# Patient Record
Sex: MALE | Race: BLACK OR AFRICAN AMERICAN | Employment: FULL TIME | URBAN - METROPOLITAN AREA
[De-identification: names, ages, dates, MRNs, and addresses within clinical notes are randomized per-mention and may not be internally consistent; named-entity substitution may affect disease eponyms.]

---

## 2021-10-20 ENCOUNTER — OFFICE VISIT (OUTPATIENT)
Dept: PRIMARY CARE CLINIC | Age: 41
End: 2021-10-20
Payer: COMMERCIAL

## 2021-10-20 VITALS
DIASTOLIC BLOOD PRESSURE: 85 MMHG | TEMPERATURE: 98.1 F | HEART RATE: 74 BPM | OXYGEN SATURATION: 100 % | HEIGHT: 70 IN | SYSTOLIC BLOOD PRESSURE: 118 MMHG | WEIGHT: 171 LBS | RESPIRATION RATE: 16 BRPM | BODY MASS INDEX: 24.48 KG/M2

## 2021-10-20 DIAGNOSIS — S69.91XS INJURY OF FINGER OF RIGHT HAND, SEQUELA: Primary | ICD-10-CM

## 2021-10-20 PROCEDURE — 99212 OFFICE O/P EST SF 10 MIN: CPT | Performed by: INTERNAL MEDICINE

## 2021-10-20 RX ORDER — DICLOFENAC SODIUM 75 MG/1
75 TABLET, DELAYED RELEASE ORAL 2 TIMES DAILY
Qty: 15 TABLET | Refills: 0 | Status: SHIPPED | OUTPATIENT
Start: 2021-10-20

## 2021-10-20 RX ORDER — PREDNISONE 10 MG/1
10 TABLET ORAL SEE ADMIN INSTRUCTIONS
Qty: 21 TABLET | Refills: 0 | Status: SHIPPED | OUTPATIENT
Start: 2021-10-20

## 2021-10-20 NOTE — PROGRESS NOTES
HISTORY OF PRESENT ILLNESS  Lamine Garcia is a 46 y.o. male. Patient reports that Monday he was in a training session for the [employer] [facility] [employer]. He describes that after he took his glove off his right thumb was in pain. Is not sure if he was hit or clipped from the punching. Reports that it is painful to rotate in and out. Has been having some swelling. Is his dominate hand, so has been painful to use. Denies any numbness but has some tingling. Visit Vitals  /85   Pulse 74   Temp 98.1 °F (36.7 °C)   Resp 16   Ht 5' 10\" (1.778 m)   Wt 171 lb (77.6 kg)   SpO2 100%   BMI 24.54 kg/m²   History reviewed. No pertinent past medical history. History reviewed. No pertinent surgical history. Family History   Problem Relation Age of Onset    No Known Problems Mother      Outpatient Encounter Medications as of 10/20/2021   Medication Sig Dispense Refill    predniSONE (STERAPRED DS) 10 mg dose pack Take 1 Tablet by mouth See Admin Instructions. See administration instruction per 10mg dose pack 21 Tablet 0    diclofenac EC (VOLTAREN) 75 mg EC tablet Take 1 Tablet by mouth two (2) times a day. 15 Tablet 0    diazepam (VALIUM) 5 mg tablet Take 1 tablet by mouth every eight (8) hours as needed (spasm). 15 tablet 0     No facility-administered encounter medications on file as of 10/20/2021. HPI    Review of Systems   Constitutional: Negative. Respiratory: Negative. Cardiovascular: Negative. Musculoskeletal: Positive for joint pain. Neurological: Positive for tingling. Negative for tremors. Physical Exam  Vitals and nursing note reviewed. Cardiovascular:      Rate and Rhythm: Normal rate and regular rhythm. Pulmonary:      Effort: Pulmonary effort is normal.      Breath sounds: Normal breath sounds. Musculoskeletal:        Hands:       Comments: Tenderness to touch, swollen, no open area   Skin:     General: Skin is warm.    Neurological:      Mental Status: He is alert and oriented to person, place, and time. ASSESSMENT and PLAN  Diagnoses and all orders for this visit:    1. Injury of finger of right hand, sequela  -     XR THUMB RT MIN 2 V; Future  -     predniSONE (STERAPRED DS) 10 mg dose pack; Take 1 Tablet by mouth See Admin Instructions. See administration instruction per 10mg dose pack  -     diclofenac EC (VOLTAREN) 75 mg EC tablet; Take 1 Tablet by mouth two (2) times a day. -     Work restriction is to no grasp, of write excessively.  Needs to splint, rest and ice.         -     Return back within a week for     Follow-up and Dispositions    · Return in about 1 week (around 10/27/2021), or if symptoms worsen or fail to improve.       lab results and schedule of future lab studies reviewed with patient  reviewed medications and side effects in detail

## 2021-10-20 NOTE — PROGRESS NOTES
Chief Complaint   Patient presents with    Thumb Pain     Patient in room #5 with complaints of right thumb pain from injury on Monday at work

## 2022-08-01 ENCOUNTER — OFFICE VISIT (OUTPATIENT)
Dept: PRIMARY CARE CLINIC | Age: 42
End: 2022-08-01

## 2022-08-01 DIAGNOSIS — Z20.822 CLOSE EXPOSURE TO COVID-19 VIRUS: Primary | ICD-10-CM

## 2022-08-01 DIAGNOSIS — B34.9 VIRAL ILLNESS: ICD-10-CM

## 2022-08-01 LAB — SARS-COV-2 PCR, POC: NEGATIVE

## 2022-08-01 PROCEDURE — 99442 PR PHYS/QHP TELEPHONE EVALUATION 11-20 MIN: CPT | Performed by: NURSE PRACTITIONER

## 2022-08-01 PROCEDURE — 87635 SARS-COV-2 COVID-19 AMP PRB: CPT | Performed by: NURSE PRACTITIONER

## 2022-08-01 NOTE — PROGRESS NOTES
Marietta Valles is a 38 y.o. male evaluated via telephone on 8/1/2022. Consent:  He and/or health care decision maker is aware that that he may receive a bill for this telephone service, depending on his insurance coverage, and has provided verbal consent to proceed: Yes      Documentation:      The patient arrived at clinic with viral symptoms which included: cough , sore throat and congestion      X   3 days. The patient   was  vaccinated. They report a positive exposure today. Wife tested + today. The patient was tested for COVID 19 with the ACCULA PCR test in their vehicle. COVID screening questions scanned into chart. Results for orders placed or performed in visit on 08/01/22   POCT COVID-19, SARS-COV-2, PCR   Result Value Ref Range    SARS-COV-2 PCR, POC Negative Negative       ICD-10-CM ICD-9-CM    1. Close exposure to COVID-19 virus  Z20.822 V01.79       2. Viral illness  B34.9 079.99 POCT COVID-19, SARS-COV-2, PCR          I communicated with the patient and/or health care decision maker about    --    test results    Details of this discussion including any medical advice provided:   Quarantine per CDC guidelines for close exposure. May retest 5-7 days if symptoms worsen. I have spent 5-10 minutes obtaining and reviewing COVID screening questions and discussing test results with the patient regarding the diagnosis and importance of compliance with the treatment plan as well as documenting on the day of the visit. I affirm this is a Patient Initiated Episode with an Patient who has not had a related appointment within my department in the past 7 days or scheduled within the next 24 hours.     Total Time: minutes: 5-10 minutes    Note: not billable if this call serves to triage the patient into an appointment for the relevant concern      JACQUELYN Dodge

## 2022-08-24 ENCOUNTER — TRANSCRIBE ORDER (OUTPATIENT)
Dept: REGISTRATION | Age: 42
End: 2022-08-24

## 2022-08-24 ENCOUNTER — HOSPITAL ENCOUNTER (OUTPATIENT)
Dept: GENERAL RADIOLOGY | Age: 42
Discharge: HOME | End: 2022-08-24
Payer: OTHER GOVERNMENT

## 2022-08-24 DIAGNOSIS — M13.80 MIGRATORY POLYARTHRITIS: Primary | ICD-10-CM

## 2022-08-24 DIAGNOSIS — M13.80 MIGRATORY POLYARTHRITIS: ICD-10-CM

## 2022-08-24 PROCEDURE — 73120 X-RAY EXAM OF HAND: CPT
